# Patient Record
Sex: FEMALE | Race: WHITE | Employment: FULL TIME | URBAN - METROPOLITAN AREA
[De-identification: names, ages, dates, MRNs, and addresses within clinical notes are randomized per-mention and may not be internally consistent; named-entity substitution may affect disease eponyms.]

---

## 2017-07-12 ENCOUNTER — OFFICE VISIT (OUTPATIENT)
Dept: FAMILY MEDICINE CLINIC | Age: 37
End: 2017-07-12

## 2017-07-12 ENCOUNTER — HOSPITAL ENCOUNTER (OUTPATIENT)
Dept: LAB | Age: 37
Discharge: HOME | End: 2017-07-12
Payer: COMMERCIAL

## 2017-07-12 VITALS
TEMPERATURE: 98.2 F | SYSTOLIC BLOOD PRESSURE: 135 MMHG | OXYGEN SATURATION: 98 % | HEART RATE: 71 BPM | BODY MASS INDEX: 24.54 KG/M2 | RESPIRATION RATE: 18 BRPM | DIASTOLIC BLOOD PRESSURE: 86 MMHG | WEIGHT: 125 LBS | HEIGHT: 60 IN

## 2017-07-12 DIAGNOSIS — Z01.419 ENCOUNTER FOR WELL WOMAN EXAM WITH ROUTINE GYNECOLOGICAL EXAM: Primary | ICD-10-CM

## 2017-07-12 DIAGNOSIS — N63.20 LEFT BREAST LUMP: ICD-10-CM

## 2017-07-12 DIAGNOSIS — Z01.419 WELL WOMAN EXAM WITH ROUTINE GYNECOLOGICAL EXAM: ICD-10-CM

## 2017-07-12 PROCEDURE — 87624 HPV HI-RISK TYP POOLED RSLT: CPT | Performed by: FAMILY MEDICINE

## 2017-07-12 PROCEDURE — 88175 CYTOPATH C/V AUTO FLUID REDO: CPT | Performed by: FAMILY MEDICINE

## 2017-07-12 NOTE — LETTER
7/21/2017 10:45 AM 
 
Ms. Sinai Unger 
[address] [facility] 
[facility] [address] Dear Sinai Unger: 
 
Please find your most recent results below. Resulted Orders CX-VAG CYTOLOGY Narrative Marianela 77  [facility] 
[address] [place] [address]      [address] [facility] [address]         [address] [facility], [facility] [address]      [place], [address]     [address] [place], [address] [facility] 
[phone] Fax# [phone]    Fax# [phone]      Fax# [phone] 
 
========================================================================== 
                       * * * CYTOPATHOLOGY REPORT* * *   
========================================================================== 
GYNECOLOGICAL * * *Procedures/Addenda Present * * * Patient:  Hawk Fontaine             Specimen #:  [id number] Age:  1980 (Age: 42)                Date of Procedure: 7/12/2017 Sex:  F                                  Date of Receipt:  7/13/2017 Hospital#:  [id number]               Date of Report: 7/17/2017 Med. Record #:  [id number] Location:  [facility]) Physician(s):  Myrna Alex MD 
 
   
 
 
 
ADDITIONAL PATIENT INFORMATION:  
RFX 12 , 25 / 39 HPV REGARDLESS:  
YES  
SOURCE: 
A: Cervical/Endocervical Imaged Processed Thin Prep 
 
 
============================================================================ 
                                * * * FINAL INTERPRETATION* * * Cervical/Endocervical Imaged Processed Thin Prep Satisfactory for evaluation. No endocervical/metaplastic cells present. NEGATIVE FOR INTRAEPITHELIAL LESION OR MALIGNANCY. Lashonda Franco,  CT (AS Shanique Baig HPV HR Interpretation Test: HPV, high-risk Result: NEGATIVE Reference Interval: Negative This high-risk HPV test detects fourteen high-risk types 
(16/18/31/33/35/39/45/51/52/56/58/59/66/68) without differentiation, using 
nucleic acid amplification method. Test performed by: [facility]  [place] [address] Phone number:  [phone] Rob Shashank * * *Electronically Signed* * * 7/17/2017 ICD10 Codes: 
 H12.386 The Pap test is a screening procedure to aid in the detection of cervical 
cancer and its precursors. It should not be used as the sole means of 
detecting cervical cancer. As with any laboratory test, false negative 
and false positive results are known to occur. RECOMMENDATIONS: 
 
    
   
    
  Negative PAP smear Negative HPV HR Please call me if you have any questions: [phone] Sincerely, [facility]REACH INSURANCE

## 2017-07-12 NOTE — PROGRESS NOTES
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 female for Well Woman Check. No LMP recorded. Patient is not currently having periods (Reason: IUD). Social History: single partner, contraception - IUD. Pertinent past medical history:    Patient Active Problem List   Diagnosis Code    Tobacco use disorder F17.200    IUD (intrauterine device) in place Z97.5    History of ovarian cyst Z87.42    Recurrent cold sores B00.1     Current Outpatient Prescriptions   Medication Sig Dispense Refill    levonorgestrel (MIRENA) 20 mcg/24 hr (5 years) IUD 1 Each by IntraUTERine route once. Allergies   Allergen Reactions    Sulfa (Sulfonamide Antibiotics) Itching        ROS:  Feeling well. No dyspnea or chest pain on exertion. No abdominal pain, change in bowel habits, black or bloody stools. No urinary tract symptoms. GYN ROS: no vaginal bleeding, she complains of tenderness in left breast with associated fibroglandular tissue. No neurological complaints. Objective:     Visit Vitals    /86 (BP 1 Location: Left arm, BP Patient Position: Sitting)    Pulse 71    Temp 98.2 °F (36.8 °C) (Oral)    Resp 18    Ht 5' (1.524 m)    Wt 125 lb (56.7 kg)    SpO2 98%    BMI 24.41 kg/m2     The patient appears well, alert, oriented x 3, in no distress. ENT normal.  Neck supple. No adenopathy or thyromegaly. GILLIAN. Lungs are clear, good air entry, no wheezes, rhonchi or rales. S1 and S2 normal, no murmurs, regular rate and rhythm. Abdomen soft without tenderness, guarding, mass or organomegaly. Extremities show no edema, normal peripheral pulses.  Neurological is normal, no focal findings. BREAST EXAM: breasts appear normal, fibroglandular masses, left greater than right side, no skin or nipple changes or axillary nodes, bilateral implants    PELVIC EXAM: normal external genitalia, vulva, vagina, cervix, uterus and adnexa    Assessment/Plan:   well woman  Left breast fibroglandular tissue, rule out other causes  pap smear  return annually or prn    ICD-10-CM ICD-9-CM    1. Encounter for well woman exam with routine gynecological exam Z01.419 V72.31 PAP IG, APTIMA HPV AND RFX 16/18,45 ([id number])      LIPID PANEL      CBC W/O DIFF      METABOLIC PANEL, COMPREHENSIVE   2. Left breast lump N63 611.72 US BREAST LT COMPLETE 4 QUAD      REFERRAL TO BREAST SURGERY   3. Well woman exam with routine gynecological exam Z01.419 V72.31     [V72.31]   .

## 2017-07-12 NOTE — PROGRESS NOTES
Chief Complaint   Patient presents with    Well Woman     1. Have you been to the ER, urgent care clinic since your last visit? Hospitalized since your last visit? No    2. Have you seen or consulted any other health care providers outside of the [address] [place] [address] since your last visit? Include any pap smears or colon screening.  No

## 2017-07-12 NOTE — MR AVS SNAPSHOT
Visit Information Date & Time Provider Department Dept. Phone Encounter #  
 7/12/2017  9:20 AM Tello Lindquist [phone] [id number] Upcoming Health Maintenance Date Due Pneumococcal 19-64 Medium Risk (1 of 1 - PPSV23) 1/23/1997 DTaP/Tdap/Td series (1 - Tdap) 1/23/1999 INFLUENZA AGE 9 TO ADULT 8/1/2017 PAP AKA CERVICAL CYTOLOGY 7/16/2018 Allergies as of 7/12/2017  Review Complete On: 7/12/2017 By: Kirti Buenrostro LPN Severity Noted Reaction Type Reactions Sulfa (Sulfonamide Antibiotics) High 07/12/2017    Itching Current Immunizations  Reviewed on 10/8/2010 Name Date Hepatitis B Vaccine 4/13/2011, 11/8/2010, 10/8/2010  1:23 PM  
 PPD 4/13/2011 Not reviewed this visit You Were Diagnosed With   
  
 Codes Comments Encounter for well woman exam with routine gynecological exam    -  Primary ICD-10-CM: T58.527 ICD-9-CM: V72.31 Left breast lump     ICD-10-CM: D13 
ICD-9-CM: 611.72 Vitals BP Pulse Temp Resp Height(growth percentile) Weight(growth percentile) 135/86 (BP 1 Location: Left arm, BP Patient Position: Sitting) 71 98.2 °F (36.8 °C) (Oral) 18 5' (1.524 m) 125 lb (56.7 kg) SpO2 BMI OB Status Smoking Status 98% 24.41 kg/m2 IUD Current Every Day Smoker Vitals History BMI and BSA Data Body Mass Index Body Surface Area  
 24.41 kg/m 2 1.55 m 2 Preferred Pharmacy Pharmacy Name Phone [id number] [address] [facility] [phone] Your Updated Medication List  
  
   
This list is accurate as of: 7/12/17 10:26 AM.  Always use your most recent med list.  
  
  
  
  
 levonorgestrel 20 mcg/24 hr (5 years) IUD Commonly known as:  MIRENA  
1 Each by IntraUTERine route once. We Performed the Following CBC W/O DIFF [48221 CPT(R)] LIPID PANEL [54876 CPT(R)] METABOLIC PANEL, COMPREHENSIVE [71281 CPT(R)] PAP IG, APTIMA HPV AND RFX 16/18,45 ([id number]) [[id number] Custom] REFERRAL TO BREAST SURGERY [REF10 Custom] Comments:  
 Please evaluate patient for left breast lump To-Do List   
 07/12/2017 Imaging:  US BREAST LT COMPLETE 4 QUAD Referral Information Referral ID Referred By Referred To  
  
 [id number] Hilary Liu MD   
   [facility] [address] [facility], [address] [place] [address] Phone: [phone] Fax: [phone] Visits Status Start Date End Date 1 New Request 7/12/17 7/12/18 If your referral has a status of pending review or denied, additional information will be sent to support the outcome of this decision. Introducing [facility] & HEALTH SERVICES! Lynn Borrero introduces [employer] patient portal. Now you can access parts of your medical record, email your doctor's office, and request medication refills online. 1. In your internet browser, go to [email or web address] 2. Click on the First Time User? Click Here link in the Sign In box. You will see the New Member Sign Up page. 3. Enter your [employer] Access Code exactly as it appears below. You will not need to use this code after youve completed the sign-up process. If you do not sign up before the expiration date, you must request a new code. · [employer] Access Code: [email or web address] Expires: 10/10/2017 10:23 AM 
 
4. Enter the last four digits of your Social Security Number (xxxx) and Date of Birth (mm/dd/yyyy) as indicated and click Submit. You will be taken to the next sign-up page. 5. Create a MMIC Solutionst ID. This will be your [employer] login ID and cannot be changed, so think of one that is secure and easy to remember. 6. Create a [employer] password. You can change your password at any time. 7. Enter your Password Reset Question and Answer.  This can be used at a later time if you forget your password. 8. Enter your e-mail address. You will receive e-mail notification when new information is available in [address] 9. Click Sign Up. You can now view and download portions of your medical record. 10. Click the Download Summary menu link to download a portable copy of your medical information. If you have questions, please visit the Frequently Asked Questions section of the [employer] website. Remember, [employer] is NOT to be used for urgent needs. For medical emergencies, dial 911. Now available from your iPhone and Android! Please provide this summary of care documentation to your next provider. Your primary care clinician is listed as [address] [place] [address]. If you have any questions after today's visit, please call [phone].

## 2017-07-12 NOTE — LETTER
7/17/2017 8:49 AM 
 
Ms. Madison Henderson 
[address] [facility] 
[address] [facility] [address] [address] Dear Madison Henderson: 
 
Please find your most recent results below. Resulted Orders LIPID PANEL Result Value Ref Range Cholesterol, total 157 100 - 199 mg/dL Triglyceride 116 0 - 149 mg/dL HDL Cholesterol 52 >39 mg/dL VLDL, calculated 23 5 - 40 mg/dL LDL, calculated 82 0 - 99 mg/dL Narrative Performed at:  [address]  [id number] : Sylvia Dougherty MD, Phone:  [phone] CBC W/O DIFF Result Value Ref Range WBC 12.6 (H) 3.4 - 10.8 x10E3/uL  
 RBC 4.65 3.77 - 5.28 x10E6/uL HGB 15.2 11.1 - 15.9 g/dL HCT 46.1 34.0 - 46.6 % MCV 99 (H) 79 - 97 fL  
 MCH 32.7 26.6 - 33.0 pg  
 MCHC 33.0 31.5 - 35.7 g/dL  
 RDW 13.5 12.3 - 15.4 % PLATELET 020 391 - 885 x10E3/uL Narrative Performed at:  [address]  [id number] : Sylvia Dougherty MD, Phone:  [phone] METABOLIC PANEL, COMPREHENSIVE Result Value Ref Range Glucose 76 65 - 99 mg/dL Comment:  
   Specimen received in contact with cells. No visible hemolysis 
present. However GLUC may be decreased and K increased. Clinical 
correlation indicated. BUN 16 6 - 20 mg/dL Creatinine 1.07 (H) 0.57 - 1.00 mg/dL GFR est non-AA 66 >59 mL/min/1.73 GFR est AA 76 >59 mL/min/1.73  
 BUN/Creatinine ratio 15 9 - 23 Sodium 138 134 - 144 mmol/L Potassium 4.3 3.5 - 5.2 mmol/L Chloride 98 96 - 106 mmol/L  
 CO2 21 18 - 29 mmol/L Calcium 9.0 8.7 - 10.2 mg/dL Protein, total 6.8 6.0 - 8.5 g/dL Albumin 4.3 3.5 - 5.5 g/dL GLOBULIN, TOTAL 2.5 1.5 - 4.5 g/dL A-G Ratio 1.7 1.2 - 2.2 Bilirubin, total 0.2 0.0 - 1.2 mg/dL Alk. phosphatase 46 39 - 117 IU/L  
 AST (SGOT) 20 0 - 40 IU/L  
 ALT (SGPT) 15 0 - 32 IU/L Narrative Performed at:  [place] [address] [address]  [id number] : Angel Parham MD, Phone:  [phone] CVD REPORT Result Value Ref Range INTERPRETATION Note Comment:  
   Supplement report is available. Narrative Performed at:  [address] [place] [address]  [id number] : Flip Lal PhD, Phone:  [phone] RECOMMENDATIONS: 
 
Cholesterol level looks great Slightly elevated WBC count -- would like to repeat in one month Creatinine = 1.07 -- likely due to degree of muscle mass; but with elevated BP would like for her to recheck her BPs periodically to ensure that her BP is below 140/90 on a regular basis Please send in BP log Please call me if you have any questions: [phone] Sincerely, Sherin Romero MD

## 2017-07-13 ENCOUNTER — TELEPHONE (OUTPATIENT)
Dept: FAMILY MEDICINE CLINIC | Age: 37
End: 2017-07-13

## 2017-07-13 NOTE — TELEPHONE ENCOUNTER
Kayley Leblanc from [facility] calling to inform Dr. Honey Meraz she ordered a US BREAST LT COMPLETE 4 QUAD [[id number]] (Order [id number])   But they also need her to order a Diagnostic Mammo.

## 2017-07-14 ENCOUNTER — TELEPHONE (OUTPATIENT)
Dept: FAMILY MEDICINE CLINIC | Age: 37
End: 2017-07-14

## 2017-07-14 DIAGNOSIS — Z01.419 ENCOUNTER FOR WELL WOMAN EXAM WITH ROUTINE GYNECOLOGICAL EXAM: Primary | ICD-10-CM

## 2017-07-14 LAB
ALBUMIN SERPL-MCNC: 4.3 G/DL (ref 3.5–5.5)
ALBUMIN/GLOB SERPL: 1.7 {RATIO} (ref 1.2–2.2)
ALP SERPL-CCNC: 46 IU/L (ref 39–117)
ALT SERPL-CCNC: 15 IU/L (ref 0–32)
AST SERPL-CCNC: 20 IU/L (ref 0–40)
BILIRUB SERPL-MCNC: 0.2 MG/DL (ref 0–1.2)
BUN SERPL-MCNC: 16 MG/DL (ref 6–20)
BUN/CREAT SERPL: 15 (ref 9–23)
CALCIUM SERPL-MCNC: 9 MG/DL (ref 8.7–10.2)
CHLORIDE SERPL-SCNC: 98 MMOL/L (ref 96–106)
CHOLEST SERPL-MCNC: 157 MG/DL (ref 100–199)
CO2 SERPL-SCNC: 21 MMOL/L (ref 18–29)
CREAT SERPL-MCNC: 1.07 MG/DL (ref 0.57–1)
ERYTHROCYTE [DISTWIDTH] IN BLOOD BY AUTOMATED COUNT: 13.5 % (ref 12.3–15.4)
GLOBULIN SER CALC-MCNC: 2.5 G/DL (ref 1.5–4.5)
GLUCOSE SERPL-MCNC: 76 MG/DL (ref 65–99)
HCT VFR BLD AUTO: 46.1 % (ref 34–46.6)
HDLC SERPL-MCNC: 52 MG/DL
HGB BLD-MCNC: 15.2 G/DL (ref 11.1–15.9)
INTERPRETATION, 910389: NORMAL
LDLC SERPL CALC-MCNC: 82 MG/DL (ref 0–99)
MCH RBC QN AUTO: 32.7 PG (ref 26.6–33)
MCHC RBC AUTO-ENTMCNC: 33 G/DL (ref 31.5–35.7)
MCV RBC AUTO: 99 FL (ref 79–97)
PLATELET # BLD AUTO: 300 X10E3/UL (ref 150–379)
POTASSIUM SERPL-SCNC: 4.3 MMOL/L (ref 3.5–5.2)
PROT SERPL-MCNC: 6.8 G/DL (ref 6–8.5)
RBC # BLD AUTO: 4.65 X10E6/UL (ref 3.77–5.28)
SODIUM SERPL-SCNC: 138 MMOL/L (ref 134–144)
TRIGL SERPL-MCNC: 116 MG/DL (ref 0–149)
VLDLC SERPL CALC-MCNC: 23 MG/DL (ref 5–40)
WBC # BLD AUTO: 12.6 X10E3/UL (ref 3.4–10.8)

## 2017-07-16 NOTE — PROGRESS NOTES
Cholesterol level looks great    Slightly elevated WBC count -- would like to repeat in one month    Creatinine = 1.07 -- likely due to degree of muscle mass; but with elevated BP would like for her to recheck her BPs periodically to ensure that her BP is below 140/90 on a regular basis    Please send in BP log

## 2017-07-18 ENCOUNTER — OFFICE VISIT (OUTPATIENT)
Dept: SURGERY | Age: 37
End: 2017-07-18

## 2017-07-18 VITALS — HEIGHT: 60 IN | WEIGHT: 127 LBS | BODY MASS INDEX: 24.94 KG/M2

## 2017-07-18 DIAGNOSIS — N63.20 BREAST MASS, LEFT: Primary | ICD-10-CM

## 2017-07-18 NOTE — MR AVS SNAPSHOT
Visit Information Date & Time Provider Department Dept. Phone Encounter #  
 7/18/2017  1:30 PM Ivette Luciano, [address] [facility] [phone] [id number] Upcoming Health Maintenance Date Due Pneumococcal 19-64 Medium Risk (1 of 1 - PPSV23) 1/23/1997 DTaP/Tdap/Td series (1 - Tdap) 1/23/1999 INFLUENZA AGE 9 TO ADULT 8/1/2017 PAP AKA CERVICAL CYTOLOGY 7/12/2020 Allergies as of 7/18/2017  Review Complete On: 7/18/2017 By: Ivette Luciano MD  
  
 Severity Noted Reaction Type Reactions Sulfa (Sulfonamide Antibiotics) High 07/12/2017    Itching Current Immunizations  Reviewed on 10/8/2010 Name Date Hepatitis B Vaccine 4/13/2011, 11/8/2010, 10/8/2010  1:23 PM  
 PPD 4/13/2011 Not reviewed this visit Vitals Height(growth percentile) Weight(growth percentile) BMI OB Status Smoking Status 5' (1.524 m) 127 lb (57.6 kg) 24.8 kg/m2 IUD Current Every Day Smoker BMI and BSA Data Body Mass Index Body Surface Area  
 24.8 kg/m 2 1.56 m 2 Preferred Pharmacy [facility] Name Phone [id number] [address] AT [facility] [phone] Your Updated Medication List  
  
   
This list is accurate as of: 7/18/17  3:35 PM.  Always use your most recent med list.  
  
  
  
  
 levonorgestrel 20 mcg/24 hr (5 years) IUD Commonly known as:  MIRENA  
1 Each by IntraUTERine route once. Patient Instructions Breast Lumps: Care Instructions Your Care Instructions Breast lumps are common, especially in women between ages 27 and 48. Many womens breasts feel lumpy and tender before their menstrual period. Women also may have lumps when they are breastfeeding. Breast lumps may go away after menopause. All new breast lumps in women after menopause should be checked by a doctor.  
Although lumps may be normal for you, it is important to have your doctor check any lump or thickness that is not like the rest of your breast to make sure it is not cancer. A lump may be larger, harder, or different from the rest of your breast tissue. Follow-up care is a key part of your treatment and safety. Be sure to make and go to all appointments, and call your doctor if you are having problems. Its also a good idea to know your test results and keep a list of the medicines you take. How can you care for yourself at home? · Make an appointment to have a mammogram and other follow-up visits as recommended by your doctor. When should you call for help? Call your doctor now or seek immediate medical care if: 
· You have new changes in a breast, such as: ¨ A lump or thickening in your breast or armpit. ¨ A change in the breast's size or shape. ¨ Skin changes, such as dimples or puckers. ¨ Nipple discharge. ¨ A change in the color or feel of the skin of your breast or the darker area around the nipple (areola). ¨ A change in the shape of the nipple (it may look like it's being pulled into the breast). · You have symptoms of a breast infection, such as: 
¨ Increased pain, swelling, redness, or warmth around a breast. 
¨ Red streaks extending from the breast. 
¨ Pus draining from a breast. 
¨ A fever. Watch closely for changes in your health, and be sure to contact your doctor if: 
· You do not get better as expected. Where can you learn more? Go to http://king-imer.info/. Enter R141 in the search box to learn more about \"Breast Lumps: Care Instructions. \" Current as of: October 13, 2016 Content Version: 11.3 © 8671-4641 Hole 19. Care instructions adapted under license by [employer] (which disclaims liability or warranty for this information).  If you have questions about a medical condition or this instruction, always ask your healthcare professional. [employer] disclaims any warranty or liability for your use of this information. Breast Lumps: Care Instructions Your Care Instructions Breast lumps are common, especially in women between ages 27 and 48. Many womens breasts feel lumpy and tender before their menstrual period. Women also may have lumps when they are breastfeeding. Breast lumps may go away after menopause. All new breast lumps in women after menopause should be checked by a doctor. Although lumps may be normal for you, it is important to have your doctor check any lump or thickness that is not like the rest of your breast to make sure it is not cancer. A lump may be larger, harder, or different from the rest of your breast tissue. Follow-up care is a key part of your treatment and safety. Be sure to make and go to all appointments, and call your doctor if you are having problems. Its also a good idea to know your test results and keep a list of the medicines you take. How can you care for yourself at home? · Make an appointment to have a mammogram and other follow-up visits as recommended by your doctor. When should you call for help? Call your doctor now or seek immediate medical care if: 
· You have new changes in a breast, such as: ¨ A lump or thickening in your breast or armpit. ¨ A change in the breast's size or shape. ¨ Skin changes, such as dimples or puckers. ¨ Nipple discharge. ¨ A change in the color or feel of the skin of your breast or the darker area around the nipple (areola). ¨ A change in the shape of the nipple (it may look like it's being pulled into the breast). · You have symptoms of a breast infection, such as: 
¨ Increased pain, swelling, redness, or warmth around a breast. 
¨ Red streaks extending from the breast. 
¨ Pus draining from a breast. 
¨ A fever. Watch closely for changes in your health, and be sure to contact your doctor if: 
· You do not get better as expected. Where can you learn more? Go to http://king-imer.info/. Enter C612 in the search box to learn more about \"Breast Lumps: Care Instructions. \" Current as of: October 13, 2016 Content Version: 11.3 © 0990-2985 [employer]arden, [employer]. Care instructions adapted under license by [employer] (which disclaims liability or warranty for this information). If you have questions about a medical condition or this instruction, always ask your healthcare professional. [facility] Hospital[facility] [address] any warranty or liability for your use of this information. Introducing [facility] & HEALTH SERVICES! [facility] introduces [employer] patient portal. Now you can access parts of your medical record, email your doctor's office, and request medication refills online. 1. In your internet browser, go to [email or web address] 2. Click on the First Time User? Click Here link in the Sign In box. You will see the New Member Sign Up page. 3. Enter your [employer] Counts Access Code exactly as it appears below. You will not need to use this code after youve completed the sign-up process. If you do not sign up before the expiration date, you must request a new code. · [employer] Counts Access Code: [email or web address] Expires: 10/10/2017 10:23 AM 
 
4. Enter the last four digits of your Social Security Number (xxxx) and Date of Birth (mm/dd/yyyy) as indicated and click Submit. You will be taken to the next sign-up page. 5. Create a Everyone Counts ID. This will be your [employer] Counts login ID and cannot be changed, so think of one that is secure and easy to remember. 6. Create a Everyone Counts password. You can change your password at any time. 7. Enter your Password Reset Question and Answer. This can be used at a later time if you forget your password. 8. Enter your e-mail address. You will receive e-mail notification when new information is available in [address] 9. Click Sign Up. You can now view and download portions of your medical record. 10. Click the Download Summary menu link to download a portable copy of your medical information. If you have questions, please visit the Frequently Asked Questions section of the "[employer]" website. Remember, "[employer]" is NOT to be used for urgent needs. For medical emergencies, dial 911. Now available from your iPhone and Android! Please provide this summary of care documentation to your next provider. Your primary care clinician is listed as [address] [place] [address]. If you have any questions after today's visit, please call [phone].

## 2017-07-18 NOTE — PROGRESS NOTES
HISTORY OF PRESENT ILLNESS  James Padilla is a 42 y.o. female. HPI ESTABLISHED patient here for LEFT breast lump. In May she noticed a marble size lump to her outer, upper, LEFT breast.  Denies pain. No skin or nipple changes. Saw Dr. Rani Jones  for LEFT breast lump which was a lymph node. Obstetric History       T0      L1     SAB0   TAB0   Ectopic0   Molar0   Multiple0   Live Births0    Obstetric Comments   Menarche:  11. LMP: IUD. # of Children:  1. Age at Delivery of First Child:  27   Hysterectomy/oophorectomy:  /NO/NO. Breast Bx: none  Hx of Breast Feeding:  no. BCP:  no. Hormone therapy:  none. Family history - paternal grandmother and great grandmother had breast cancer, both are . Dad and paternal uncle are survivors of prostate cancer. Mammogram    REPORT:  History: Palpable abnormality left breast.     Bilateral digital diagnostic mammography, interpreted in conjunction with   CAD over-read,  with Chaz and standard views, was performed. The breast   parenchymal pattern is heterogeneously dense. There are no suspicious   masses, microcalcifications, skin thickening, nipple retraction, or   distortion.     Real-time ultrasonography of the left breast, in the region of palpable   abnormality 9:00 demonstrates a 2.7 mm hypoechoic mass which likely   represents a tiny lymph node. ROS    Physical Exam   Pulmonary/Chest: Right breast exhibits no inverted nipple, no mass, no nipple discharge, no skin change and no tenderness. Left breast exhibits mass (5 mm smooth mobile nodule UOQ/low axilla. 7:00 periareolar 5 mm mobile nodule). Left breast exhibits no inverted nipple, no nipple discharge, no skin change and no tenderness. Breast asymmetry: bilateral implants. Lymphadenopathy:     She has no cervical adenopathy. She has no axillary adenopathy. Right: No supraclavicular adenopathy present.         Left: No supraclavicular adenopathy present. BREAST ULTRASOUND  Indication: Left  breast mass UOQ and LEFT breast mass 7:00 periareolar   Technique: The area was scanned using a high-frequency linear-array near-field transducer  Findings: UOQ 4 mm normal lymph node and 7:00 periareolar is a ridge of normal breast tissue  Impression: ridge of normal breast tissue at 7:00 site. Benign axillary lymph node    Disposition: No worrisome finding on ultrasound  ASSESSMENT and PLAN    ICD-10-CM ICD-9-CM    1. Breast mass, left N63 611.72      LEFT breast mass x 2, both benign  PRN follow up  Start yearly screening mammograms next year, age 34.

## 2017-07-18 NOTE — PATIENT INSTRUCTIONS
Breast Lumps: Care Instructions  Your Care Instructions  Breast lumps are common, especially in women between ages 27 and 48. Many womens breasts feel lumpy and tender before their menstrual period. Women also may have lumps when they are breastfeeding. Breast lumps may go away after menopause. All new breast lumps in women after menopause should be checked by a doctor. Although lumps may be normal for you, it is important to have your doctor check any lump or thickness that is not like the rest of your breast to make sure it is not cancer. A lump may be larger, harder, or different from the rest of your breast tissue. Follow-up care is a key part of your treatment and safety. Be sure to make and go to all appointments, and call your doctor if you are having problems. Its also a good idea to know your test results and keep a list of the medicines you take. How can you care for yourself at home? · Make an appointment to have a mammogram and other follow-up visits as recommended by your doctor. When should you call for help? Call your doctor now or seek immediate medical care if:  · You have new changes in a breast, such as:  ¨ A lump or thickening in your breast or armpit. ¨ A change in the breast's size or shape. ¨ Skin changes, such as dimples or puckers. ¨ Nipple discharge. ¨ A change in the color or feel of the skin of your breast or the darker area around the nipple (areola). ¨ A change in the shape of the nipple (it may look like it's being pulled into the breast). · You have symptoms of a breast infection, such as:  ¨ Increased pain, swelling, redness, or warmth around a breast.  ¨ Red streaks extending from the breast.  ¨ Pus draining from a breast.  ¨ A fever. Watch closely for changes in your health, and be sure to contact your doctor if:  · You do not get better as expected. Where can you learn more? Go to http://king-imer.info/.   Enter I456 in the search box to learn more about \"Breast Lumps: Care Instructions. \"  Current as of: October 13, 2016  Content Version: 11.3  © 2808-5813 [employer]. Care instructions adapted under license by [employer] (which disclaims liability or warranty for this information). If you have questions about a medical condition or this instruction, always ask your healthcare professional. [address] any warranty or liability for your use of this information. Breast Lumps: Care Instructions  Your Care Instructions  Breast lumps are common, especially in women between ages 27 and 48. Many womens breasts feel lumpy and tender before their menstrual period. Women also may have lumps when they are breastfeeding. Breast lumps may go away after menopause. All new breast lumps in women after menopause should be checked by a doctor. Although lumps may be normal for you, it is important to have your doctor check any lump or thickness that is not like the rest of your breast to make sure it is not cancer. A lump may be larger, harder, or different from the rest of your breast tissue. Follow-up care is a key part of your treatment and safety. Be sure to make and go to all appointments, and call your doctor if you are having problems. Its also a good idea to know your test results and keep a list of the medicines you take. How can you care for yourself at home? · Make an appointment to have a mammogram and other follow-up visits as recommended by your doctor. When should you call for help? Call your doctor now or seek immediate medical care if:  · You have new changes in a breast, such as:  ¨ A lump or thickening in your breast or armpit. ¨ A change in the breast's size or shape. ¨ Skin changes, such as dimples or puckers. ¨ Nipple discharge. ¨ A change in the color or feel of the skin of your breast or the darker area around the nipple (areola).   ¨ A change in the shape of the nipple (it may look like it's being pulled into the breast). · You have symptoms of a breast infection, such as:  ¨ Increased pain, swelling, redness, or warmth around a breast.  ¨ Red streaks extending from the breast.  ¨ Pus draining from a breast.  ¨ A fever. Watch closely for changes in your health, and be sure to contact your doctor if:  · You do not get better as expected. Where can you learn more? Go to http://king-imer.info/. Enter S111 in the search box to learn more about \"Breast Lumps: Care Instructions. \"  Current as of: October 13, 2016  Content Version: 11.3  © 4238-3196 [employer]. Care instructions adapted under license by [employer] (which disclaims liability or warranty for this information). If you have questions about a medical condition or this instruction, always ask your healthcare professional. [place] [address] any warranty or liability for your use of this information.

## 2017-07-18 NOTE — COMMUNICATION BODY
HISTORY OF PRESENT ILLNESS  Mary Yousif is a 42 y.o. female. HPI ESTABLISHED patient here for LEFT breast lump. In May she noticed a marble size lump to her outer, upper, LEFT breast.  Denies pain. No skin or nipple changes. Saw Dr. Titi Molina  for LEFT breast lump which was a lymph node. Obstetric History       T0      L1     SAB0   TAB0   Ectopic0   Molar0   Multiple0   Live Births0    Obstetric Comments   Menarche:  11. LMP: IUD. # of Children:  1. Age at Delivery of First Child:  27   Hysterectomy/oophorectomy:  /NO/NO. Breast Bx: none  Hx of Breast Feeding:  no. BCP:  no. Hormone therapy:  none. Family history - paternal grandmother and great grandmother had breast cancer, both are . Dad and paternal uncle are survivors of prostate cancer. Mammogram    REPORT:  History: Palpable abnormality left breast.     Bilateral digital diagnostic mammography, interpreted in conjunction with   CAD over-read,  with Chaz and standard views, was performed. The breast   parenchymal pattern is heterogeneously dense. There are no suspicious   masses, microcalcifications, skin thickening, nipple retraction, or   distortion.     Real-time ultrasonography of the left breast, in the region of palpable   abnormality 9:00 demonstrates a 2.7 mm hypoechoic mass which likely   represents a tiny lymph node. ROS    Physical Exam   Pulmonary/Chest: Right breast exhibits no inverted nipple, no mass, no nipple discharge, no skin change and no tenderness. Left breast exhibits mass (5 mm smooth mobile nodule UOQ/low axilla. 7:00 periareolar 5 mm mobile nodule). Left breast exhibits no inverted nipple, no nipple discharge, no skin change and no tenderness. Breast asymmetry: bilateral implants. Lymphadenopathy:     She has no cervical adenopathy. She has no axillary adenopathy. Right: No supraclavicular adenopathy present.         Left: No supraclavicular adenopathy present. BREAST ULTRASOUND  Indication: Left  breast mass UOQ and LEFT breast mass 7:00 periareolar   Technique: The area was scanned using a high-frequency linear-array near-field transducer  Findings: UOQ 4 mm normal lymph node and 7:00 periareolar is a ridge of normal breast tissue  Impression: ridge of normal breast tissue at 7:00 site. Benign axillary lymph node    Disposition: No worrisome finding on ultrasound  ASSESSMENT and PLAN    ICD-10-CM ICD-9-CM    1. Breast mass, left N63 611.72      LEFT breast mass x 2, both benign  PRN follow up  Start yearly screening mammograms next year, age 34.

## 2019-10-08 ENCOUNTER — OFFICE VISIT (OUTPATIENT)
Dept: FAMILY MEDICINE CLINIC | Age: 39
End: 2019-10-08

## 2019-10-08 VITALS
SYSTOLIC BLOOD PRESSURE: 146 MMHG | DIASTOLIC BLOOD PRESSURE: 85 MMHG | HEIGHT: 60 IN | OXYGEN SATURATION: 98 % | BODY MASS INDEX: 26.11 KG/M2 | RESPIRATION RATE: 18 BRPM | HEART RATE: 75 BPM | WEIGHT: 133 LBS | TEMPERATURE: 98.8 F

## 2019-10-08 DIAGNOSIS — N63.20 LEFT BREAST MASS: Primary | ICD-10-CM

## 2019-10-08 DIAGNOSIS — N63.0 BREAST MASS: ICD-10-CM

## 2019-10-08 NOTE — PROGRESS NOTES
Zora Farr is an 37 y.o. 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 No current facility-administered medications for this visit.         Problem List - reviewed:  Patient Active Problem List   Diagnosis Code    Tobacco use disorder F17.200    IUD (intrauterine device) in place Z97.5    History of ovarian cyst Z87.42    Recurrent cold sores B00.1    Breast mass N63.0         Past Medical History - reviewed:  Past Medical History:   Diagnosis Date    Ovarian cyst 12/10/2015         Past Surgical History - reviewed:   Past Surgical History:   Procedure Laterality Date    HX BREAST AUGMENTATION           Social History - reviewed:  Social History     Socioeconomic History    Marital status: SINGLE     Spouse name: Not on file    Number of children: Not on file    Years of education: Not on file    Highest education level: Not on file   Occupational History    Not on file   Social Needs    Financial resource strain: Not on file    Food insecurity:     Worry: Not on file     Inability: Not on file    Transportation needs:     Medical: Not on file     Non-medical: Not on file   Tobacco Use    Smoking status: Current Every Day Smoker     Packs/day: 1.00     Years: 17.00     Pack years: 17.00     Types: Cigarettes    Smokeless tobacco: Never Used    Tobacco comment: patient intent is to quit Jan 14, 2011   Substance and Sexual Activity    Alcohol use: Yes     Comment: social     Drug use: No    Sexual activity: Yes     Partners: Male     Birth control/protection: IUD   Lifestyle    Physical activity:     Days per week: Not on file     Minutes per session: Not on file    Stress: Not on file   Relationships    Social connections:     Talks on phone: Not on file     Gets together: Not on file     Attends Restorationist service: Not on file     Active member of club or organization: Not on file     Attends meetings of clubs or organizations: Not on file     Relationship status: Not on file    Intimate partner violence:     Fear of current or ex partner: Not on file     Emotionally abused: Not on file     Physically abused: Not on file     Forced sexual activity: Not on file   Other Topics Concern    Not on file   Social History Narrative    Not on file         Family History - reviewed:  Family History   Problem Relation Age of Onset    No Known Problems Mother     Cancer Father         prostate    Breast Cancer Paternal Grandmother     Cancer Paternal Uncle         prostate         ROS  Review of Systems - see HPI    Physical Exam  Visit Vitals  /85 (BP 1 Location: Left arm, BP Patient Position: Sitting)   Pulse 75   Temp 98.8 °F (37.1 °C) (Oral)   Resp 18   Ht 5' (1.524 m)   Wt 133 lb (60.3 kg)   SpO2 98%   BMI 25.97 kg/m²       General appearance - alert, well appearing, and in no distress  Psych - normal mood and affect   Breast exam -- implants in place; right -- no masses, no dimpling, no nipple discharge  Left -- nontender, ? Small mass superior to nipple at 1 o'clock (difficult for examiner to palpate)    Assessment/Plan    ICD-10-CM ICD-9-CM    1.  Left breast mass N63.20 611.72 BELLA 3D REED W MAMMO BI SCREENING INCL CAD US BREAST LT COMPLETE 4 QUAD   2. Breast mass N63.0 611.72 BELLA 3D REED W MAMMO BI SCREENING INCL CAD       I have discussed the diagnosis with the patient and the intended plan as seen in the above orders. Based on results of imaging, probable referral to the [facility].     Aleksander Dutton MD

## 2019-10-10 ENCOUNTER — TELEPHONE (OUTPATIENT)
Dept: FAMILY MEDICINE CLINIC | Age: 39
End: 2019-10-10

## 2019-10-10 ENCOUNTER — HOSPITAL ENCOUNTER (OUTPATIENT)
Dept: MAMMOGRAPHY | Age: 39
Discharge: HOME | End: 2019-10-10
Attending: FAMILY MEDICINE

## 2019-10-10 DIAGNOSIS — N63.20 LUMP OF LEFT BREAST: Primary | ICD-10-CM

## 2019-10-10 DIAGNOSIS — N63.20 LEFT BREAST MASS: ICD-10-CM

## 2019-10-10 DIAGNOSIS — N63.0 BREAST MASS: ICD-10-CM

## 2019-10-10 NOTE — TELEPHONE ENCOUNTER
Radha with [facility],    Notes that patient was added today at 1:30 pm for Mammo for left breast mass and new order is needed as a result for diagnostic mammogram.    Also notes that patient is scheduled at [facility] for ultrasound at 3:30 pm and patient should be seen by them at one location instead of [address].  She states patient will need to be contacted     Questions call [phone]

## 2019-10-10 NOTE — TELEPHONE ENCOUNTER
Hari Monzon from [place] [address] called stating they are not able to do a diagnostic mammo at [facility] and this patient needs a diagnostic mammo not a screening mammo. Patient will need a diagnostic mammo because she feels a lump. She is not able to do the ultrasound until after the mammogram is done (normally done on the same day). Hari Monzon contacted [facility] to try to get the patient in sooner than December and was informed they have no availably. She is calling here to inform the correct order will need to be placed (diagnostic mammo) and we will need to contact one of the facilities that have the equipment to do the diagnostic mammo ([address] [place] [address] or [facility]) to see if they have a sooner appointment (if the patient needs to be seen right away). Please contact patient when addressed to inform.

## 2019-10-15 ENCOUNTER — HOSPITAL ENCOUNTER (OUTPATIENT)
Dept: MAMMOGRAPHY | Age: 39
Discharge: HOME | End: 2019-10-15
Attending: FAMILY MEDICINE
Payer: COMMERCIAL

## 2019-10-15 DIAGNOSIS — N63.20 LEFT BREAST LUMP: ICD-10-CM

## 2019-10-15 DIAGNOSIS — N63.20 LUMP OF LEFT BREAST: ICD-10-CM

## 2019-10-15 PROCEDURE — 77062 BREAST TOMOSYNTHESIS BI: CPT

## 2019-10-15 PROCEDURE — 76642 ULTRASOUND BREAST LIMITED: CPT
